# Patient Record
Sex: MALE | URBAN - METROPOLITAN AREA
[De-identification: names, ages, dates, MRNs, and addresses within clinical notes are randomized per-mention and may not be internally consistent; named-entity substitution may affect disease eponyms.]

---

## 2018-03-19 ENCOUNTER — APPOINTMENT (RX ONLY)
Dept: URBAN - METROPOLITAN AREA CLINIC 39 | Facility: CLINIC | Age: 23
Setting detail: DERMATOLOGY
End: 2018-03-19

## 2018-03-19 DIAGNOSIS — L70.0 ACNE VULGARIS: ICD-10-CM

## 2018-03-19 PROCEDURE — 99202 OFFICE O/P NEW SF 15 MIN: CPT

## 2018-03-19 RX ORDER — TRETINOIN 0.25 MG/G
CREAM TOPICAL
Qty: 45 | Refills: 3 | Status: ERX | COMMUNITY
Start: 2018-03-19

## 2018-03-19 RX ADMIN — TRETINOIN: 0.25 CREAM TOPICAL at 18:15

## 2018-03-19 NOTE — HPI: PIMPLES (ACNE)
How Severe Is Your Acne?: moderate
Is This A New Presentation, Or A Follow-Up?: Acne
Additional Comments (Use Complete Sentences): Patient states acne is stable and he is mostly concerned with the scarring. He also says when he uses otc acne or face washes his skin gets very red and sensitive. He would like to discuss appropriate products he can use.

## 2022-04-02 ENCOUNTER — EMERGENCY (EMERGENCY)
Facility: HOSPITAL | Age: 27
LOS: 1 days | Discharge: HOME | End: 2022-04-02
Attending: EMERGENCY MEDICINE
Payer: COMMERCIAL

## 2022-04-02 VITALS
RESPIRATION RATE: 18 BRPM | SYSTOLIC BLOOD PRESSURE: 123 MMHG | HEIGHT: 71 IN | OXYGEN SATURATION: 99 % | DIASTOLIC BLOOD PRESSURE: 78 MMHG | HEART RATE: 90 BPM | WEIGHT: 164.91 LBS | TEMPERATURE: 98 F

## 2022-04-02 VITALS
OXYGEN SATURATION: 99 % | RESPIRATION RATE: 18 BRPM | SYSTOLIC BLOOD PRESSURE: 121 MMHG | HEART RATE: 89 BPM | DIASTOLIC BLOOD PRESSURE: 78 MMHG

## 2022-04-02 PROCEDURE — 73130 X-RAY EXAM OF HAND: CPT | Mod: 26,RT

## 2022-04-02 PROCEDURE — 73130 X-RAY EXAM OF HAND: CPT

## 2022-04-02 PROCEDURE — 99283 EMERGENCY DEPT VISIT LOW MDM: CPT

## 2022-04-02 PROCEDURE — 99283 EMERGENCY DEPT VISIT LOW MDM: CPT | Mod: 25

## 2022-04-02 RX ORDER — IBUPROFEN 200 MG
600 TABLET ORAL ONCE
Refills: 0 | Status: COMPLETED | OUTPATIENT
Start: 2022-04-02 | End: 2022-04-02

## 2022-04-02 RX ADMIN — Medication 600 MILLIGRAM(S): at 10:20

## 2022-04-02 NOTE — ED PROVIDER NOTE - CARE PROVIDER_API CALL
Sim Holland)  Surgery  [address], [address]  [place] [address]  Phone: [phone]  Fax: [phone]  Follow Up Time:

## 2022-04-02 NOTE — ED PROVIDER NOTE - CLINICAL SUMMARY MEDICAL DECISION MAKING FREE TEXT BOX
26 year old male with an isolated injury to his right hand. Will treat with pain medications and a cold compress. Will obtain x-ray and likely discharge with hand surgery follow up.

## 2022-04-02 NOTE — ED PROVIDER NOTE - NSFOLLOWUPINSTRUCTIONS_ED_ALL_ED_FT
Take 975mg of Tylenol every 6hrs and 600mg Ibuprofen (Advil/Motrin) every 8 hrs as needed for the next 5-7 days.  Drink plenty of fluids.  Follow up with your doctor/Hand Surgeon or in the Clinic as discussed if pains don't significantly improve in 7-10 days.  Return to the ER for any concerns.

## 2022-04-02 NOTE — ED PROVIDER NOTE - PATIENT PORTAL LINK FT
You can access the FollowMyHealth Patient Portal offered by [facility] by registering at the following website: [email or web address]. By joining [employer]’s FollowMyHealth portal, you will also be able to view your health information using other applications (apps) compatible with our system.

## 2022-04-02 NOTE — ED ADULT NURSE NOTE - OBJECTIVE STATEMENT
pt is here for hand pain.  pt stated that s/p fall skate boarding yesterday, c/o right hand pain, denied LOC or headache, denied chest pain or sob,

## 2022-04-02 NOTE — ED PROVIDER NOTE - OBJECTIVE STATEMENT
26 year old right hand dominant male with no significant past medical history presents to the ED with complaints of a right hand injury. The patient states that he fell of his skateboard yesterday and is now experiencing pain and swelling to his right 1st metacarpal bone and numbness to his 1st finger. The patient denies any other injuries at this time. NKDA.

## 2022-04-02 NOTE — ED PROVIDER NOTE - WR INTERPRETATION 1
Darren patient's  called left message on nurse voice mail to call back at [phone]    Shraddha Hensno RN  [facility]       no fx/disloc

## 2024-06-20 NOTE — ED PROVIDER NOTE - PROGRESS NOTE DETAILS
Juan R,     Let's try Imitrex for migraines.     Continue other medications as we discussed.     Please see neurosurgery to discuss surgery.    no fx/disloc on Xrays. Good f/u instructions given w hand Sx. Will DC